# Patient Record
Sex: FEMALE | Race: WHITE | NOT HISPANIC OR LATINO | Employment: UNEMPLOYED | URBAN - METROPOLITAN AREA
[De-identification: names, ages, dates, MRNs, and addresses within clinical notes are randomized per-mention and may not be internally consistent; named-entity substitution may affect disease eponyms.]

---
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
## 2021-07-12 ENCOUNTER — TELEPHONE (OUTPATIENT)
Dept: FAMILY MEDICINE | Facility: CLINIC | Age: 21
End: 2021-07-12

## 2021-07-12 NOTE — TELEPHONE ENCOUNTER
Reason for call:  Other   Patient called regarding (reason for call): appointment  Additional comments:   Patient going back to college in 3 weeks. She would really prefer seeing Day for her sports physical if this is possible. Please call Mom back to see if this can be possible.    Phone number to reach patient:  Other phone number:  [phone] Koki    Best Time:  any    Can we leave a detailed message on this number?  YES    Travel screening: Not Applicable

## 2021-07-14 NOTE — TELEPHONE ENCOUNTER
eJlena Heller CMA contacted [facility] on 07/14/21 and left a message. If patient calls back please schedule Physical/sports forms next wk or the wk after.   Per provider ok to use same day slot

## 2021-07-19 ENCOUNTER — OFFICE VISIT (OUTPATIENT)
Dept: FAMILY MEDICINE | Facility: CLINIC | Age: 21
End: 2021-07-19
Payer: COMMERCIAL

## 2021-07-19 VITALS
OXYGEN SATURATION: 97 % | DIASTOLIC BLOOD PRESSURE: 69 MMHG | HEART RATE: 80 BPM | WEIGHT: 162 LBS | HEIGHT: 70 IN | TEMPERATURE: 97.9 F | BODY MASS INDEX: 23.19 KG/M2 | RESPIRATION RATE: 16 BRPM | SYSTOLIC BLOOD PRESSURE: 116 MMHG

## 2021-07-19 DIAGNOSIS — Z11.1 SCREENING EXAMINATION FOR PULMONARY TUBERCULOSIS: ICD-10-CM

## 2021-07-19 DIAGNOSIS — Z30.011 ENCOUNTER FOR INITIAL PRESCRIPTION OF CONTRACEPTIVE PILLS: ICD-10-CM

## 2021-07-19 DIAGNOSIS — L70.0 ACNE VULGARIS: ICD-10-CM

## 2021-07-19 DIAGNOSIS — Z11.59 NEED FOR HEPATITIS C SCREENING TEST: ICD-10-CM

## 2021-07-19 DIAGNOSIS — Z02.89 ENCOUNTER FOR COMPLETION OF FORM WITH PATIENT: ICD-10-CM

## 2021-07-19 DIAGNOSIS — Z11.4 SCREENING FOR HIV (HUMAN IMMUNODEFICIENCY VIRUS): ICD-10-CM

## 2021-07-19 DIAGNOSIS — Z00.01 ENCOUNTER FOR ROUTINE ADULT MEDICAL EXAM WITH ABNORMAL FINDINGS: Primary | ICD-10-CM

## 2021-07-19 PROCEDURE — 87389 HIV-1 AG W/HIV-1&-2 AB AG IA: CPT | Performed by: PHYSICIAN ASSISTANT

## 2021-07-19 PROCEDURE — 86481 TB AG RESPONSE T-CELL SUSP: CPT | Mod: 59 | Performed by: PHYSICIAN ASSISTANT

## 2021-07-19 PROCEDURE — 86803 HEPATITIS C AB TEST: CPT | Performed by: PHYSICIAN ASSISTANT

## 2021-07-19 PROCEDURE — 86481 TB AG RESPONSE T-CELL SUSP: CPT | Performed by: PHYSICIAN ASSISTANT

## 2021-07-19 PROCEDURE — 36415 COLL VENOUS BLD VENIPUNCTURE: CPT | Performed by: PHYSICIAN ASSISTANT

## 2021-07-19 PROCEDURE — 99395 PREV VISIT EST AGE 18-39: CPT | Performed by: PHYSICIAN ASSISTANT

## 2021-07-19 RX ORDER — SULFACETAMIDE SODIUM 100 MG/ML
LOTION TOPICAL
COMMUNITY
Start: 2020-12-28 | End: 2021-07-19

## 2021-07-19 RX ORDER — LEVONORGESTREL AND ETHINYL ESTRADIOL 100-20(84)
1 KIT ORAL DAILY
Qty: 91 TABLET | Refills: 3 | Status: SHIPPED | OUTPATIENT
Start: 2021-07-19 | End: 2022-02-14 | Stop reason: SINTOL

## 2021-07-19 RX ORDER — TRETINOIN 0.5 MG/G
CREAM TOPICAL
Qty: 45 G | Refills: 3 | Status: SHIPPED | OUTPATIENT
Start: 2021-07-19 | End: 2023-07-27

## 2021-07-19 RX ORDER — SULFACETAMIDE SODIUM 100 MG/ML
LOTION TOPICAL DAILY
Qty: 118 ML | Refills: 3 | Status: SHIPPED | OUTPATIENT
Start: 2021-07-19 | End: 2023-07-27

## 2021-07-19 RX ORDER — TRETINOIN 0.5 MG/G
CREAM TOPICAL
COMMUNITY
Start: 2020-12-28 | End: 2021-07-19

## 2021-07-19 ASSESSMENT — ENCOUNTER SYMPTOMS
DIARRHEA: 0
COUGH: 0
DYSURIA: 0
FEVER: 0
FREQUENCY: 0
BREAST MASS: 0
NAUSEA: 0
HEMATOCHEZIA: 0
ARTHRALGIAS: 0
NERVOUS/ANXIOUS: 0
HEARTBURN: 0
PARESTHESIAS: 0
EYE PAIN: 0
MYALGIAS: 0
CHILLS: 0
JOINT SWELLING: 0
ABDOMINAL PAIN: 0
CONSTIPATION: 0
WEAKNESS: 0
HEMATURIA: 0
HEADACHES: 0
SORE THROAT: 0
DIZZINESS: 0
SHORTNESS OF BREATH: 0
PALPITATIONS: 0

## 2021-07-19 ASSESSMENT — MIFFLIN-ST. JEOR: SCORE: 1589.46

## 2021-07-19 NOTE — LETTER
July 23, 2021      Sheridan Bustillos  [address]  [place] [address]        Dear ,    We are writing to inform you of your test results.    Your tuberculosis screening is negative.       Resulted Orders   Hepatitis C Screen Reflex to HCV RNA Quant and Genotype   Result Value Ref Range    Hepatitis C Antibody Nonreactive Nonreactive    Narrative    Assay performance characteristics have not been established for newborns, infants, and children.   HIV Antigen Antibody Combo   Result Value Ref Range    HIV Antigen Antibody Combo Nonreactive Nonreactive      Comment:      HIV-1 p24 Ag & HIV-1/HIV-2 Ab Not Detected   Quantiferon TB Gold Plus   Result Value Ref Range    Quantiferon-TB Gold Plus Negative Negative      Comment:      No interferon gamma response to M.tuberculosis antigens was detected. Infection with M.tuberculosis is unlikely, however a single negative result does not exclude infection. In patients at high risk for infection, a second test should be considered in accordance with the 2017 ATS/IDSA/CDC Clinical Pract  ice Guidelines for Diagnosis of Tuberculosis in Adults and Children     TB1 Ag minus Nil Value 0.04 IU/mL    TB2 Ag minus Nil Value 0.04 IU/mL    Mitogen minus Nil Result 9.97 IU/mL    Nil Result 0.03 IU/mL   Quantiferon TB Gold Plus Grey Tube   Result Value Ref Range    Quantiferon Nil Tube 0.03 IU/mL   Quantiferon TB Gold Plus Green Tube   Result Value Ref Range    Quantiferon TB1 Tube 0.07 IU/mL   Quantiferon TB Gold Plus Yellow Tube   Result Value Ref Range    Quantiferon TB2 Tube 0.07    Quantiferon TB Gold Plus Purple Tube   Result Value Ref Range    Quantiferon Mitogen 10.00 IU/mL   Quantiferon TB Gold Plus   Result Value Ref Range    Quantiferon-TB Gold Plus Negative Negative      Comment:      No interferon gamma response to M.tuberculosis antigens was detected. Infection with M.tuberculosis is unlikely, however a single negative result does not exclude infection. In patients at  high risk for infection, a second test should be considered in accordance with the 2017 ATS/IDSA/CDC Clinical Pract  ice Guidelines for Diagnosis of Tuberculosis in Adults and Children     TB1 Ag minus Nil Value 0.04 IU/mL    TB2 Ag minus Nil Value 0.04 IU/mL    Mitogen minus Nil Result 9.97 IU/mL    Nil Result 0.03 IU/mL       If you have any questions or concerns, please call the clinic at the number listed above.       Sincerely,      Day Watson PA-C/mp
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
## 2021-07-20 LAB
HCV AB SERPL QL IA: NONREACTIVE
HIV 1+2 AB+HIV1 P24 AG SERPL QL IA: NONREACTIVE

## 2021-07-21 LAB
QUANTIFERON MITOGEN: 10 IU/ML
QUANTIFERON NIL TUBE: 0.03 IU/ML
QUANTIFERON TB1 TUBE: 0.07 IU/ML
QUANTIFERON TB2 TUBE: 0.07

## 2021-07-22 LAB
GAMMA INTERFERON BACKGROUND BLD IA-ACNC: 0.03 IU/ML
GAMMA INTERFERON BACKGROUND BLD IA-ACNC: 0.03 IU/ML
M TB IFN-G BLD-IMP: NEGATIVE
M TB IFN-G BLD-IMP: NEGATIVE
M TB IFN-G CD4+ BCKGRND COR BLD-ACNC: 9.97 IU/ML
M TB IFN-G CD4+ BCKGRND COR BLD-ACNC: 9.97 IU/ML
MITOGEN IGNF BCKGRD COR BLD-ACNC: 0.04 IU/ML

## 2021-07-23 NOTE — RESULT ENCOUNTER NOTE
Please send the following letter to the patient:    Sheridan,    Your tuberculosis screening is negative.    Please call me with any questions or concerns.          Day Watson PA-C

## 2021-11-24 ENCOUNTER — TELEPHONE (OUTPATIENT)
Dept: FAMILY MEDICINE | Facility: CLINIC | Age: 21
End: 2021-11-24
Payer: COMMERCIAL

## 2021-11-24 NOTE — TELEPHONE ENCOUNTER
Called patient, however unable to leave a message.    Paulina Whelan RN BSN  [facility]

## 2021-11-24 NOTE — TELEPHONE ENCOUNTER
Reason for Call:  Other     Detailed comments: Patient said that she was prescribed a birth control and she was not to get mensae and she has gotten this and has questions please advise.    Phone Number Patient can be reached at: Home number on file [phone] (home)    Best Time:     Can we leave a detailed message on this number? YES    Call taken on 11/24/2021 at 10:59 AM by Nanda Hyatt

## 2021-11-26 NOTE — TELEPHONE ENCOUNTER
Called patient again, however unable to leave a message.     No active My Chart.     Paulina Whelan RN BSN  [facility]

## 2021-11-30 NOTE — TELEPHONE ENCOUNTER
No return call after 3 attempts. Will close encounter. Await patient to call again.  Haylee Marcos RN  [facility]

## 2022-02-14 ENCOUNTER — VIRTUAL VISIT (OUTPATIENT)
Dept: FAMILY MEDICINE | Facility: CLINIC | Age: 22
End: 2022-02-14
Payer: COMMERCIAL

## 2022-02-14 DIAGNOSIS — F41.0 PANIC ATTACK: Primary | ICD-10-CM

## 2022-02-14 DIAGNOSIS — F43.23 SITUATIONAL MIXED ANXIETY AND DEPRESSIVE DISORDER: ICD-10-CM

## 2022-02-14 PROCEDURE — 99214 OFFICE O/P EST MOD 30 MIN: CPT | Mod: TEL | Performed by: PHYSICIAN ASSISTANT

## 2022-02-14 RX ORDER — HYDROXYZINE HYDROCHLORIDE 25 MG/1
25 TABLET, FILM COATED ORAL 3 TIMES DAILY PRN
Qty: 30 TABLET | Refills: 0 | Status: SHIPPED | OUTPATIENT
Start: 2022-02-14 | End: 2022-03-14

## 2022-02-14 RX ORDER — VENLAFAXINE HYDROCHLORIDE 37.5 MG/1
37.5 CAPSULE, EXTENDED RELEASE ORAL DAILY
Qty: 30 CAPSULE | Refills: 0 | Status: SHIPPED | OUTPATIENT
Start: 2022-02-14 | End: 2022-03-08

## 2022-02-14 ASSESSMENT — ANXIETY QUESTIONNAIRES
IF YOU CHECKED OFF ANY PROBLEMS ON THIS QUESTIONNAIRE, HOW DIFFICULT HAVE THESE PROBLEMS MADE IT FOR YOU TO DO YOUR WORK, TAKE CARE OF THINGS AT HOME, OR GET ALONG WITH OTHER PEOPLE: VERY DIFFICULT
7. FEELING AFRAID AS IF SOMETHING AWFUL MIGHT HAPPEN: SEVERAL DAYS
1. FEELING NERVOUS, ANXIOUS, OR ON EDGE: SEVERAL DAYS
3. WORRYING TOO MUCH ABOUT DIFFERENT THINGS: NEARLY EVERY DAY
6. BECOMING EASILY ANNOYED OR IRRITABLE: SEVERAL DAYS
5. BEING SO RESTLESS THAT IT IS HARD TO SIT STILL: SEVERAL DAYS
GAD7 TOTAL SCORE: 9
2. NOT BEING ABLE TO STOP OR CONTROL WORRYING: SEVERAL DAYS

## 2022-02-14 ASSESSMENT — PATIENT HEALTH QUESTIONNAIRE - PHQ9
SUM OF ALL RESPONSES TO PHQ QUESTIONS 1-9: 12
5. POOR APPETITE OR OVEREATING: SEVERAL DAYS

## 2022-02-14 NOTE — PATIENT INSTRUCTIONS
Start effexor (venlafaxine) daily for anxiety and depression.  This may take 4 weeks to kick in.  Monitor for side effects and reach out if an issue.   Use hydroxyzine as needed for anxiety/panic.  It may make you drowsy. Do not drive or mix with alcohol.      Limit caffeine.   Continue exercise (on [email or web address])  Continue to hold the birth control.  May revisit this once mental health is stabilized and may try a different birth control pill.   Hold off on the stress reducing vitamin, ok to take the Vitamin D.    Continue visits with therapist.

## 2022-02-14 NOTE — PROGRESS NOTES
Sheridan is a 22 year old who is being evaluated via a billable telephone visit (with Mom Katy)    What phone number would you like to be contacted at? [phone]  How would you like to obtain your AVS? Mail a copy    Assessment & Plan     Panic attack      I discussed the potential side effects of antianxiety medications as well as the likelihood of worsening before improvement over the next few weeks. I reemphasized the importance of a multi-armed approach towards the treatment of anxiety including regular exercise, adequate sleep, and a well rounded, low-glycemic diet.  In addition, I emphasized the importance of ongoing development of her support network. If new or worsening symptoms, she will seek help immediately.    Start effexor (venlafaxine) daily for anxiety and depression.  This may take 4 weeks to kick in.  Monitor for side effects and reach out if an issue.   Use hydroxyzine as needed for anxiety/panic.  It may make you drowsy. Do not drive or mix with alcohol.      Limit caffeine.   Continue exercise (on [employer])  Continue to hold the birth control.  May revisit this once mental health is stabilized and may try a different birth control pill.   Hold off on the stress reducing vitamin, ok to take the Vitamin D.    Continue visits with therapist.        - hydrOXYzine (ATARAX) 25 MG tablet; Take 1 tablet (25 mg) by mouth 3 times daily as needed for anxiety  - venlafaxine (EFFEXOR-XR) 37.5 MG 24 hr capsule; Take 1 capsule (37.5 mg) by mouth daily    Situational mixed anxiety and depressive disorder      Will start medication today.  We discussed various treatment options, including pros and cons of each.  I discussed in detail possible side effects of medications and that the full benefits of medication may take 4-6 weeks, patient verbalized understanding.  See ishBowl for orders.    We have discussed counseling as an adjuvent to medical treatment, patient will continue with her counseling.      Follow-up in 4 weeks.      Patient is to follow-up sooner should symptoms change or worsen.     Discussed risk of increased suicidal thoughts when starting medication, she verbalized understanding and will reach out immediately if this occurs.     I warned Sheridan Bustillos that hydroxyzine may cause drowsiness and she is not to drive or operate heavy machinery after taking this medication and do not mix with alcohol.                 - hydrOXYzine (ATARAX) 25 MG tablet; Take 1 tablet (25 mg) by mouth 3 times daily as needed for anxiety  - venlafaxine (EFFEXOR-XR) 37.5 MG 24 hr capsule; Take 1 capsule (37.5 mg) by mouth daily      30 minutes spent on the date of the encounter doing chart review, history and exam, documentation and further activities per the note       Depression Screening Follow Up    PHQ 2/14/2022   PHQ-9 Total Score 12   Q9: Thoughts of better off dead/self-harm past 2 weeks Several days                 Follow Up      Follow Up Actions Taken  has appt with therapist later today (meeting weekly)        Return in about 4 weeks (around 3/14/2022) for med recheck..    Yeny Askew PA-C  [facility] [place] is a 22 year old who presents for the following health issues  accompanied by her mother (Katy).    HPI     Abnormal Mood Symptoms  Onset/Duration: 1 year  Description: intermittent anxiety and depression  Depression (if yes, do PHQ-9): YES  Anxiety (if yes, do TONY-7): YES  Accompanying Signs & Symptoms:  Still participating in activities that you used to enjoy: YES  Fatigue: YES  Irritability: YES- sometimes  Difficulty concentrating: YES- sometimes  Changes in appetite: YES  Problems with sleep: YES  Heart racing/beating fast: YES  Abnormally elevated, expansive, or irritable mood: YES  Persistently increased activity or energy: YES- sometimes  Thoughts of hurting yourself or others: YES  History:  Recent stress or major life event: no  Prior depression  or anxiety: intermittent depression (seasonal)  Family history of depression or anxiety: YES- both  Alcohol/drug use: YES- alcohol  Difficulty sleeping: YES- waking frequently, insomnia  Precipitating or alleviating factors: working out, melatonin  Therapies tried and outcome: working out, melatonin - helps     Has noticed panic attacks for the past year (worried about dying).  Panic attacks not often, only when triggered.   Last panic attack was after drinking Celius energy drink.    She was started on birth control in July 2022 and since that time she has had a change in her mood.  She tried going off it and then went back on for a month.  Currently she is off the medication.   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  She is passing most of her classes and grades are doing well.      Mom is a counselor and Sheridan has good coping skills  Mom has a h/o MI.      Sister and Mom are both on venlafaxine.  Other family members have done well with Wellbutrin.     She does have a therapist that she meets with once per week.      Has a stress relief vitamin, wondering if she should take this.    PHQ 2/14/2022   PHQ-9 Total Score 12   Q9: Thoughts of better off dead/self-harm past 2 weeks Several days     TONY-7 SCORE 2/14/2022   Total Score 9           Review of Systems   Constitutional, HEENT, cardiovascular, pulmonary, GI, , musculoskeletal, neuro, skin, endocrine and psych systems are negative, except as otherwise noted.      Objective           Vitals:  No vitals were obtained today due to virtual visit.    Physical Exam   healthy, alert and anxious throughout visit, difficulty concentrating at times and mom available to answer questions.   PSYCH: Alert and oriented times 3; coherent speech, normal   rate and volume, Difficulty articulating thoughts at times, able   to abstract reason, no tangential thoughts, no hallucinations   or delusions  Her affect is  normal  RESP: No cough, no audible wheezing, able to talk in full sentences  Remainder of exam unable to be completed due to telephone visits                Phone call duration: 22 minutes

## 2022-02-15 ASSESSMENT — ANXIETY QUESTIONNAIRES: GAD7 TOTAL SCORE: 9

## 2022-02-16 ENCOUNTER — TELEPHONE (OUTPATIENT)
Dept: FAMILY MEDICINE | Facility: CLINIC | Age: 22
End: 2022-02-16
Payer: COMMERCIAL

## 2022-02-16 NOTE — TELEPHONE ENCOUNTER
Patient and mom calling to request letter for her school excusing her from academic requirements this week due to her mental health. Please email letter to [email or web address] and to mom at [email or web address].

## 2022-02-16 NOTE — TELEPHONE ENCOUNTER
Called patient, she gave verbal consent to speak with her mom. Below message relayed. She said the patient is doing better and getting some much needed rest. They will try the recommended advise below.     Yeny Vargas RN

## 2022-02-16 NOTE — TELEPHONE ENCOUNTER
Bee, Please email letter to addresses below.      RN pool, please call Sheridan.  She can try 1/2 tab of the hydroxyzine (if it makes her too drowsy, just stop it and we'll wait for the effexor to kick in)    Yeny Askew PA-C

## 2022-02-16 NOTE — LETTER
AUTHORIZATION FOR ADMINISTRATION OF MEDICATION AT SCHOOL      Student:  Sheridan Bustillos    YOB: 2000    I have prescribed the following medication for Sheridan to treat anxiety.    Medication:    Outpatient Medications Marked as Taking for the 22 encounter (Telephone) with Yeny Askew PA-C   Medication Sig     hydrOXYzine (ATARAX) 25 MG tablet Take 1 tablet (25 mg) by mouth 3 times daily as needed for anxiety     venlafaxine (EFFEXOR-XR) 37.5 MG 24 hr capsule Take 1 capsule (37.5 mg) by mouth daily     All authorizations  at the end of the school year or at the end of   Extended School Year summer school programs            Electronically Signed By  Provider: YENY ASKEW                                                                                             Date: 2022

## 2022-02-16 NOTE — LETTER
[facility]  [address] [place] [address]  Phone: [phone]    February 16, 2022        Sheridan Bustillos  [address]  [place] [address]          To whom it may concern:    RE: Sheridan Bustillos    Please excuse Sheridan from school and academic requirements this week (through 2/19/22) due to medical reasons.     Please contact me for questions or concerns.      Sincerely,        Yeny Askew PA-C

## 2022-02-17 NOTE — TELEPHONE ENCOUNTER
Another letter printed with medications listed, please email to addresses below  Yeny Askew PA-C

## 2022-02-17 NOTE — TELEPHONE ENCOUNTER
Mom is calling on behalf of patient for two things:     1) cutting the hydrOXYzine (ATARAX) 25 MG tablet in half is working great so far.   2) the patient's volleyball  at her school is asking for a new letter stating the name of the medications that pt is on and that they are used to treat anxiety.    Mom was advised that this will be up to Yeny MCGUIRE if a new letter is generated. OK to leave detailed info on Mom's cell at [phone].

## 2022-03-11 ENCOUNTER — TELEPHONE (OUTPATIENT)
Dept: FAMILY MEDICINE | Facility: CLINIC | Age: 22
End: 2022-03-11
Payer: COMMERCIAL

## 2022-03-11 NOTE — TELEPHONE ENCOUNTER
Spoke with tash(mother) patient has appt scheduled with Day for med check on 3/14/2022. Mom would like forms to be completed by Yeny Askew.  Forms to Yeny's in box.

## 2022-03-14 ENCOUNTER — VIRTUAL VISIT (OUTPATIENT)
Dept: FAMILY MEDICINE | Facility: CLINIC | Age: 22
End: 2022-03-14
Payer: COMMERCIAL

## 2022-03-14 DIAGNOSIS — F41.0 PANIC ATTACK: ICD-10-CM

## 2022-03-14 DIAGNOSIS — F43.23 SITUATIONAL MIXED ANXIETY AND DEPRESSIVE DISORDER: Primary | ICD-10-CM

## 2022-03-14 PROCEDURE — 99213 OFFICE O/P EST LOW 20 MIN: CPT | Mod: GT | Performed by: PHYSICIAN ASSISTANT

## 2022-03-14 RX ORDER — HYDROXYZINE HYDROCHLORIDE 25 MG/1
25 TABLET, FILM COATED ORAL 3 TIMES DAILY PRN
Qty: 30 TABLET | Refills: 3 | Status: SHIPPED | OUTPATIENT
Start: 2022-03-14 | End: 2023-07-27

## 2022-03-14 RX ORDER — VENLAFAXINE HYDROCHLORIDE 37.5 MG/1
37.5 CAPSULE, EXTENDED RELEASE ORAL DAILY
Qty: 90 CAPSULE | Refills: 0 | Status: SHIPPED | OUTPATIENT
Start: 2022-03-14 | End: 2022-07-05

## 2022-03-14 ASSESSMENT — ANXIETY QUESTIONNAIRES
5. BEING SO RESTLESS THAT IT IS HARD TO SIT STILL: SEVERAL DAYS
6. BECOMING EASILY ANNOYED OR IRRITABLE: NOT AT ALL
GAD7 TOTAL SCORE: 2
7. FEELING AFRAID AS IF SOMETHING AWFUL MIGHT HAPPEN: NOT AT ALL
3. WORRYING TOO MUCH ABOUT DIFFERENT THINGS: NOT AT ALL
1. FEELING NERVOUS, ANXIOUS, OR ON EDGE: SEVERAL DAYS
2. NOT BEING ABLE TO STOP OR CONTROL WORRYING: NOT AT ALL
IF YOU CHECKED OFF ANY PROBLEMS ON THIS QUESTIONNAIRE, HOW DIFFICULT HAVE THESE PROBLEMS MADE IT FOR YOU TO DO YOUR WORK, TAKE CARE OF THINGS AT HOME, OR GET ALONG WITH OTHER PEOPLE: SOMEWHAT DIFFICULT

## 2022-03-14 ASSESSMENT — PATIENT HEALTH QUESTIONNAIRE - PHQ9
SUM OF ALL RESPONSES TO PHQ QUESTIONS 1-9: 5
5. POOR APPETITE OR OVEREATING: NOT AT ALL

## 2022-03-14 NOTE — TELEPHONE ENCOUNTER
Had appt with Day today and this was not discussed.  Will forward along to Day to address  Yeny Askew PA-C

## 2022-03-14 NOTE — PROGRESS NOTES
Sheridan is a 22 year old who is being evaluated via a billable video visit.      How would you like to obtain your AVS? MyChart  If the video visit is dropped, the invitation should be resent by: Text to cell phone: [phone]  Will anyone else be joining your video visit? No      Video Start Time: 8:01am    Assessment & Plan     1. Situational mixed anxiety and depressive disorder    2. Panic attack        1,2) PHQ and TONY scores much better. Continue medications, no changes today. She is doing much better.      Prescription drug management         Depression Screening Follow Up    PHQ 3/14/2022   PHQ-9 Total Score 5   Q9: Thoughts of better off dead/self-harm past 2 weeks Several days       Return in about 2 months (around 5/14/2022) for your annual physical.    Day Watson PA-C  [facility] [place] Swartz is a 22 year old who presents for the following health issues     HPI     Depression and Anxiety Follow-Up    How are you doing with your depression since your last visit? Improved     How are you doing with your anxiety since your last visit?  Improved     Are you having other symptoms that might be associated with depression or anxiety? No    Have you had a significant life event? No     Do you have any concerns with your use of alcohol or other drugs? No    Social History     Tobacco Use     Smoking status: Never Smoker     Smokeless tobacco: Never Used   Vaping Use     Vaping Use: Never used   Substance Use Topics     Alcohol use: Yes     Comment: occasional     Drug use: Not Currently     PHQ 2/14/2022 3/14/2022   PHQ-9 Total Score 12 5   Q9: Thoughts of better off dead/self-harm past 2 weeks Several days Several days     TONY-7 SCORE 2/14/2022 3/14/2022   Total Score 9 2       Suicide Assessment Five-step Evaluation and Treatment (SAFE-T)      How many servings of fruits and vegetables do you eat daily?  0-1    On average, how many sweetened beverages do you drink  each day (Examples: soda, juice, sweet tea, etc.  Do NOT count diet or artificially sweetened beverages)?   2    How many days per week do you exercise enough to make your heart beat faster? 6-7    How many minutes a day do you exercise enough to make your heart beat faster? 60 or more    How many days per week do you miss taking your medication? 0        Review of Systems   Constitutional, and psych systems are negative, except as otherwise noted.      Objective           Vitals:  No vitals were obtained today due to virtual visit.    Physical Exam   GENERAL: Healthy, alert and no distress  EYES: Eyes grossly normal to inspection.  No discharge or erythema, or obvious scleral/conjunctival abnormalities.  RESP: No audible wheeze, cough, or visible cyanosis.  No visible retractions or increased work of breathing.    SKIN: Visible skin clear. No significant rash, abnormal pigmentation or lesions.  NEURO: Cranial nerves grossly intact.  Mentation and speech appropriate for age.  PSYCH: Mentation appears normal, affect normal/bright, judgement and insight intact, normal speech and appearance well-groomed.          Video-Visit Details    Type of service:  Video Visit    Video End Time: 8:07am, 6 mins    Originating Location (pt. Location): Home    Distant Location (provider location):  [facility] nub[facility]     Platform used for Video Visit: [employer]

## 2022-03-15 ASSESSMENT — ANXIETY QUESTIONNAIRES: GAD7 TOTAL SCORE: 2

## 2022-03-17 ENCOUNTER — VIRTUAL VISIT (OUTPATIENT)
Dept: FAMILY MEDICINE | Facility: CLINIC | Age: 22
End: 2022-03-17
Payer: COMMERCIAL

## 2022-03-17 DIAGNOSIS — F43.23 SITUATIONAL MIXED ANXIETY AND DEPRESSIVE DISORDER: Primary | ICD-10-CM

## 2022-03-17 DIAGNOSIS — Z02.89 ENCOUNTER FOR COMPLETION OF FORM WITH PATIENT: ICD-10-CM

## 2022-03-17 PROCEDURE — 99212 OFFICE O/P EST SF 10 MIN: CPT | Mod: GT | Performed by: PHYSICIAN ASSISTANT

## 2022-03-17 NOTE — PROGRESS NOTES
Sheridan is a 22 year old who is being evaluated via a billable video visit.      How would you like to obtain your AVS? MyChart  If the video visit is dropped, the invitation should be resent by: Text to cell phone: [phone]  Will anyone else be joining your video visit? No      Video Start Time: 8:18am    Assessment & Plan     1. Situational mixed anxiety and depressive disorder    2. Encounter for completion of form with patient        1,2) Form completed, sent to scanning.              No follow-ups on file.    LELA Avalos [facility] [place] Swartz is a 22 year old who presents for the following health issues     HPI     Forms for school. Needing a 504 plan.   Due to anxiety she has had trouble getting homework done and turning assignments in on time  Feels she would do better if she was given deadline extensions  Anxiety has improved since starting Effexor but she still has some breakthrough panic/acute anxiety - typically triggered by homework or assignment deadlines      Review of Systems   Constitutional, and psych systems are negative, except as otherwise noted.      Objective           Vitals:  No vitals were obtained today due to virtual visit.    Physical Exam   GENERAL: Healthy, alert and no distress  EYES: Eyes grossly normal to inspection.  No discharge or erythema, or obvious scleral/conjunctival abnormalities.  RESP: No audible wheeze, cough, or visible cyanosis.  No visible retractions or increased work of breathing.    SKIN: Visible skin clear. No significant rash, abnormal pigmentation or lesions.  NEURO: Cranial nerves grossly intact.  Mentation and speech appropriate for age.  PSYCH: Mentation appears normal, affect normal/bright, judgement and insight intact, normal speech and appearance well-groomed.          Video-Visit Details    Type of service:  Video Visit    Video End Time: 8:33am, 15 mins    Originating Location (pt. Location):  Home    Distant Location (provider location):  [facility] [place]     Platform used for Video Visit: [employer]

## 2022-03-22 ENCOUNTER — TELEPHONE (OUTPATIENT)
Dept: FAMILY MEDICINE | Facility: CLINIC | Age: 22
End: 2022-03-22
Payer: COMMERCIAL

## 2022-03-22 DIAGNOSIS — F43.23 SITUATIONAL MIXED ANXIETY AND DEPRESSIVE DISORDER: Primary | ICD-10-CM

## 2022-03-22 NOTE — TELEPHONE ENCOUNTER
Annette from [facility].  Calling in regards to disability forms. Diagnosis was not provided for pt.    What should we report?    [phone] - please contact Annette with verbal Diagnosis.

## 2022-03-28 NOTE — TELEPHONE ENCOUNTER
Called Annette. No answer. Left message to call back.    Monserrat Tam, SALEEM on 3/28/2022 at 8:24 AM

## 2022-07-01 DIAGNOSIS — F43.23 SITUATIONAL MIXED ANXIETY AND DEPRESSIVE DISORDER: ICD-10-CM

## 2022-07-01 DIAGNOSIS — F41.0 PANIC ATTACK: ICD-10-CM

## 2022-07-04 NOTE — TELEPHONE ENCOUNTER
Routing refill request to provider for review/approval because:  Drug not on the [facility] refill protocol   PHQ-9 score:    PHQ 3/14/2022   PHQ-9 Total Score 5   Q9: Thoughts of better off dead/self-harm past 2 weeks Several days

## 2022-07-05 RX ORDER — VENLAFAXINE HYDROCHLORIDE 37.5 MG/1
CAPSULE, EXTENDED RELEASE ORAL
Qty: 90 CAPSULE | Refills: 0 | Status: SHIPPED | OUTPATIENT
Start: 2022-07-05 | End: 2022-10-06

## 2022-07-26 ENCOUNTER — TELEPHONE (OUTPATIENT)
Dept: FAMILY MEDICINE | Facility: CLINIC | Age: 22
End: 2022-07-26

## 2022-07-26 DIAGNOSIS — Z01.89 PATIENT REQUEST FOR DIAGNOSTIC TESTING: Primary | ICD-10-CM

## 2022-07-26 NOTE — TELEPHONE ENCOUNTER
Patient's mother calling, states that patient is leaving for college at the end of the week and patient just found out that she is required by her school to have a sickle cell test completed. Patient's mother is wondering if it would be possible to have lab ordered without appointment. Order pended. Please advise.     Harriett Nazario RN   [facility]

## 2022-07-28 ENCOUNTER — OFFICE VISIT (OUTPATIENT)
Dept: FAMILY MEDICINE | Facility: CLINIC | Age: 22
End: 2022-07-28
Payer: COMMERCIAL

## 2022-07-28 VITALS
HEIGHT: 70 IN | SYSTOLIC BLOOD PRESSURE: 116 MMHG | OXYGEN SATURATION: 98 % | RESPIRATION RATE: 18 BRPM | DIASTOLIC BLOOD PRESSURE: 84 MMHG | WEIGHT: 154 LBS | BODY MASS INDEX: 22.05 KG/M2 | HEART RATE: 97 BPM | TEMPERATURE: 98.1 F

## 2022-07-28 DIAGNOSIS — D50.8 IRON DEFICIENCY ANEMIA SECONDARY TO INADEQUATE DIETARY IRON INTAKE: ICD-10-CM

## 2022-07-28 DIAGNOSIS — Z01.89 PATIENT REQUEST FOR DIAGNOSTIC TESTING: ICD-10-CM

## 2022-07-28 DIAGNOSIS — Z13.0 ENCOUNTER FOR SICKLE-CELL SCREENING: Primary | ICD-10-CM

## 2022-07-28 LAB
BASOPHILS # BLD AUTO: 0 10E3/UL (ref 0–0.2)
BASOPHILS NFR BLD AUTO: 0 %
EOSINOPHIL # BLD AUTO: 0.1 10E3/UL (ref 0–0.7)
EOSINOPHIL NFR BLD AUTO: 1 %
ERYTHROCYTE [DISTWIDTH] IN BLOOD BY AUTOMATED COUNT: 14.4 % (ref 10–15)
HCT VFR BLD AUTO: 39.5 % (ref 35–47)
HGB BLD-MCNC: 12.9 G/DL (ref 11.7–15.7)
IMM GRANULOCYTES # BLD: 0 10E3/UL
IMM GRANULOCYTES NFR BLD: 0 %
LYMPHOCYTES # BLD AUTO: 1.9 10E3/UL (ref 0.8–5.3)
LYMPHOCYTES NFR BLD AUTO: 21 %
MCH RBC QN AUTO: 26.7 PG (ref 26.5–33)
MCHC RBC AUTO-ENTMCNC: 32.7 G/DL (ref 31.5–36.5)
MCV RBC AUTO: 82 FL (ref 78–100)
MONOCYTES # BLD AUTO: 0.8 10E3/UL (ref 0–1.3)
MONOCYTES NFR BLD AUTO: 9 %
NEUTROPHILS # BLD AUTO: 6.2 10E3/UL (ref 1.6–8.3)
NEUTROPHILS NFR BLD AUTO: 69 %
PLATELET # BLD AUTO: 364 10E3/UL (ref 150–450)
RBC # BLD AUTO: 4.84 10E6/UL (ref 3.8–5.2)
WBC # BLD AUTO: 9 10E3/UL (ref 4–11)

## 2022-07-28 PROCEDURE — 85025 COMPLETE CBC W/AUTO DIFF WBC: CPT | Performed by: INTERNAL MEDICINE

## 2022-07-28 PROCEDURE — 99000 SPECIMEN HANDLING OFFICE-LAB: CPT | Performed by: INTERNAL MEDICINE

## 2022-07-28 PROCEDURE — 36415 COLL VENOUS BLD VENIPUNCTURE: CPT | Performed by: INTERNAL MEDICINE

## 2022-07-28 PROCEDURE — 99213 OFFICE O/P EST LOW 20 MIN: CPT | Performed by: INTERNAL MEDICINE

## 2022-07-28 PROCEDURE — 83021 HEMOGLOBIN CHROMOTOGRAPHY: CPT | Mod: 90 | Performed by: INTERNAL MEDICINE

## 2022-07-28 ASSESSMENT — PAIN SCALES - GENERAL: PAINLEVEL: NO PAIN (0)

## 2022-07-28 NOTE — PROGRESS NOTES
"  Assessment & Plan     Encounter for sickle-cell screening  She plays volleyball for the college  They are requesting that she get a sickle cell testing done  This has been ordered  - Hemoglobin S with Reflex to RBC Solubility; Future  - Hemoglobin S with Reflex to RBC Solubility    Iron deficiency anemia secondary to inadequate dietary iron intake  History of iron deficiency anemia in the past  Her ferritin and iron were low on her hemoglobin was 11.6  We will repeat CBC today  - CBC with platelets and differential; Future  - CBC with platelets and differential    Patient request for diagnostic testing    - Hemoglobin S with Reflex to RBC Solubility      20 minutes spent on the date of the encounter doing chart review, history and exam, documentation and further activities per the note           No follow-ups on file.    Isreal Del Toro MD  [facility]   Sheridan is a 22 year old accompanied by her mother, presenting for the following health issues:  Consult For (Blood work for school )      HPI   Requesting sickle cell testing   She plays volleyball and her college wants to get this tested        Review of Systems   Constitutional, HEENT, cardiovascular, pulmonary, gi and gu systems are negative, except as otherwise noted.      Objective    /84 (BP Location: Left arm, Patient Position: Chair, Cuff Size: Adult Regular)   Pulse 97   Temp 98.1  F (36.7  C) (Tympanic)   Resp 18   Ht 1.785 m (5' 10.28\")   Wt 69.9 kg (154 lb)   SpO2 98%   BMI 21.92 kg/m    Body mass index is 21.92 kg/m .  Physical Exam   GENERAL: healthy, alert and no distress  EYES: Eyes grossly normal to inspection, PERRL and conjunctivae and sclerae normal  MS: no gross musculoskeletal defects noted, no edema  SKIN: no suspicious lesions or rashes  NEURO: Normal strength and tone, mentation intact and speech normal                    .  ..  "

## 2022-07-28 NOTE — PATIENT INSTRUCTIONS
At [facility], we strive to deliver an exceptional experience to you, every time we see you. If you receive a survey, please complete it as we do value your feedback.  If you have MyChart, you can expect to receive results automatically within 24 hours of their completion.  Your provider will send a note interpreting your results as well.   If you do not have MyChart, you should receive your results in about a week by mail.    Your care team:                            Family Medicine Internal Medicine   MD Austin Keita MD Shantel Branch-Fleming, MD Srinivasa Vaka, MD Katya Belousova, JEAN PIERRE Bacon CNP, MD ([facility]   Juni Hastings, MD Thao Dumont MD Amelia Massimini APRN CNP Kim Thein, APRN CNP Bethany Templen, MD             Clinic hours: Monday - Thursday 7 am-6 pm; Fridays 7 am-5 pm.   Urgent care: Monday - Friday 10 am- 8 pm; Saturday and Sunday 9 am-5 pm.    Clinic: [phone]       [facility]: Monday - Thursday 8 am - 7 pm; Friday 8 am - 6 pm  [facility]: [phone]

## 2022-07-30 LAB — HGB S BLD QL: NEGATIVE

## 2022-09-10 ENCOUNTER — HEALTH MAINTENANCE LETTER (OUTPATIENT)
Age: 22
End: 2022-09-10

## 2022-09-17 DIAGNOSIS — Z30.41 ENCOUNTER FOR SURVEILLANCE OF CONTRACEPTIVE PILLS: Primary | ICD-10-CM

## 2022-09-21 RX ORDER — LEVONORGESTREL AND ETHINYL ESTRADIOL 100-20(84)
1 KIT ORAL DAILY
Qty: 91 TABLET | Refills: 0 | Status: SHIPPED | OUTPATIENT
Start: 2022-09-21 | End: 2023-07-27

## 2022-10-06 ENCOUNTER — MYC REFILL (OUTPATIENT)
Dept: FAMILY MEDICINE | Facility: CLINIC | Age: 22
End: 2022-10-06

## 2022-10-06 DIAGNOSIS — F41.0 PANIC ATTACK: ICD-10-CM

## 2022-10-06 DIAGNOSIS — F43.23 SITUATIONAL MIXED ANXIETY AND DEPRESSIVE DISORDER: ICD-10-CM

## 2022-10-07 RX ORDER — VENLAFAXINE HYDROCHLORIDE 37.5 MG/1
37.5 CAPSULE, EXTENDED RELEASE ORAL DAILY
Qty: 90 CAPSULE | Refills: 0 | Status: SHIPPED | OUTPATIENT
Start: 2022-10-07 | End: 2022-12-26

## 2023-01-22 DIAGNOSIS — F41.0 PANIC ATTACK: ICD-10-CM

## 2023-01-22 DIAGNOSIS — F43.23 SITUATIONAL MIXED ANXIETY AND DEPRESSIVE DISORDER: ICD-10-CM

## 2023-01-23 RX ORDER — VENLAFAXINE HYDROCHLORIDE 37.5 MG/1
37.5 CAPSULE, EXTENDED RELEASE ORAL DAILY
Qty: 30 CAPSULE | Refills: 0 | Status: SHIPPED | OUTPATIENT
Start: 2023-01-23 | End: 2023-03-01

## 2023-03-01 DIAGNOSIS — F41.0 PANIC ATTACK: ICD-10-CM

## 2023-03-01 DIAGNOSIS — F43.23 SITUATIONAL MIXED ANXIETY AND DEPRESSIVE DISORDER: ICD-10-CM

## 2023-03-01 RX ORDER — VENLAFAXINE HYDROCHLORIDE 37.5 MG/1
37.5 CAPSULE, EXTENDED RELEASE ORAL DAILY
Qty: 30 CAPSULE | Refills: 0 | Status: SHIPPED | OUTPATIENT
Start: 2023-03-01 | End: 2023-07-05

## 2023-06-01 ENCOUNTER — TELEPHONE (OUTPATIENT)
Dept: FAMILY MEDICINE | Facility: CLINIC | Age: 23
End: 2023-06-01
Payer: COMMERCIAL

## 2023-06-01 NOTE — TELEPHONE ENCOUNTER
Patient Quality Outreach    Patient is due for the following:   Cervical Cancer Screening - PAP Needed  Physical Preventive Adult Physical      Topic Date Due     COVID-19 Vaccine (3 - Pfizer series) 07/22/2021     Diptheria Tetanus Pertussis (DTAP/TDAP/TD) Vaccine (7 - Td or Tdap) 06/11/2023           Type of outreach:    Phone, left message for patient/parent to call back. and Sent [employer] message.      Questions for provider review:    None           Esther Chávez CMA  Chart routed to Care Team.

## 2023-06-01 NOTE — LETTER
June 8, 2023      Sheridan Bustillos  [address]  [place] [address]    Your team at [facility] cares about your health. We have reviewed your chart and based on our findings; we are making the following recommendations to better manage your health.     You are in particular need of attention regarding the following:     Schedule a primary care office visit with your provider for a Pap Smear to screen for Cervical Cancer.  PREVENTATIVE VISIT: Physical  OTHER FOLLOW UP: Covid, Tetanus vaccine    If you have already completed these items, please contact the clinic via phone or   365nethart so your care team can review and update your records. Thank you for   choosing [facility] for your healthcare needs. For any questions,   concerns, or to schedule an appointment please contact our clinic.    Healthy Regards,      Your [facility] Care Team

## 2023-06-08 NOTE — TELEPHONE ENCOUNTER
Patient Quality Outreach        Type of outreach:    Sent letter.    Next Steps:  Reach out within 90 days via Phone and MyChart.    Max number of attempts reached: Yes. Will try again in 90 days if patient still on fail list.    Esther Chávez [facility]  Chart routed to Care Team.

## 2023-07-04 DIAGNOSIS — F43.23 SITUATIONAL MIXED ANXIETY AND DEPRESSIVE DISORDER: ICD-10-CM

## 2023-07-04 DIAGNOSIS — F41.0 PANIC ATTACK: ICD-10-CM

## 2023-07-05 RX ORDER — VENLAFAXINE HYDROCHLORIDE 37.5 MG/1
37.5 CAPSULE, EXTENDED RELEASE ORAL DAILY
Qty: 30 CAPSULE | Refills: 0 | Status: SHIPPED | OUTPATIENT
Start: 2023-07-05 | End: 2023-07-27

## 2023-07-27 ENCOUNTER — OFFICE VISIT (OUTPATIENT)
Dept: FAMILY MEDICINE | Facility: CLINIC | Age: 23
End: 2023-07-27
Payer: COMMERCIAL

## 2023-07-27 VITALS
SYSTOLIC BLOOD PRESSURE: 110 MMHG | RESPIRATION RATE: 16 BRPM | DIASTOLIC BLOOD PRESSURE: 70 MMHG | HEART RATE: 74 BPM | HEIGHT: 70 IN | BODY MASS INDEX: 19.76 KG/M2 | OXYGEN SATURATION: 100 % | WEIGHT: 138 LBS

## 2023-07-27 DIAGNOSIS — Z12.4 CERVICAL CANCER SCREENING: ICD-10-CM

## 2023-07-27 DIAGNOSIS — F41.0 PANIC ATTACK: ICD-10-CM

## 2023-07-27 DIAGNOSIS — Z11.3 SCREENING FOR STDS (SEXUALLY TRANSMITTED DISEASES): ICD-10-CM

## 2023-07-27 DIAGNOSIS — F43.23 SITUATIONAL MIXED ANXIETY AND DEPRESSIVE DISORDER: ICD-10-CM

## 2023-07-27 DIAGNOSIS — Z00.00 ROUTINE GENERAL MEDICAL EXAMINATION AT A HEALTH CARE FACILITY: Primary | ICD-10-CM

## 2023-07-27 DIAGNOSIS — L70.0 ACNE VULGARIS: ICD-10-CM

## 2023-07-27 PROCEDURE — 99213 OFFICE O/P EST LOW 20 MIN: CPT | Mod: 25 | Performed by: PHYSICIAN ASSISTANT

## 2023-07-27 PROCEDURE — 99395 PREV VISIT EST AGE 18-39: CPT | Performed by: PHYSICIAN ASSISTANT

## 2023-07-27 PROCEDURE — 87491 CHLMYD TRACH DNA AMP PROBE: CPT | Performed by: PHYSICIAN ASSISTANT

## 2023-07-27 PROCEDURE — 87591 N.GONORRHOEAE DNA AMP PROB: CPT | Performed by: PHYSICIAN ASSISTANT

## 2023-07-27 PROCEDURE — G0145 SCR C/V CYTO,THINLAYER,RESCR: HCPCS | Performed by: PHYSICIAN ASSISTANT

## 2023-07-27 RX ORDER — SULFACETAMIDE SODIUM 100 MG/ML
LOTION TOPICAL DAILY
Qty: 118 ML | Refills: 3 | Status: SHIPPED | OUTPATIENT
Start: 2023-07-27

## 2023-07-27 RX ORDER — HYDROXYZINE HYDROCHLORIDE 25 MG/1
25 TABLET, FILM COATED ORAL 3 TIMES DAILY PRN
Qty: 30 TABLET | Refills: 3 | Status: SHIPPED | OUTPATIENT
Start: 2023-07-27

## 2023-07-27 RX ORDER — TRETINOIN 0.5 MG/G
CREAM TOPICAL
Qty: 45 G | Refills: 3 | Status: SHIPPED | OUTPATIENT
Start: 2023-07-27 | End: 2024-05-03

## 2023-07-27 RX ORDER — VENLAFAXINE HYDROCHLORIDE 37.5 MG/1
37.5 CAPSULE, EXTENDED RELEASE ORAL DAILY
Qty: 90 CAPSULE | Refills: 3 | Status: SHIPPED | OUTPATIENT
Start: 2023-07-27 | End: 2024-06-17

## 2023-07-27 ASSESSMENT — ENCOUNTER SYMPTOMS
CHILLS: 0
DYSURIA: 0
BREAST MASS: 0
COUGH: 0
EYE PAIN: 0
CONSTIPATION: 0
JOINT SWELLING: 0
PALPITATIONS: 0
NERVOUS/ANXIOUS: 0
DIZZINESS: 0
FEVER: 0
HEARTBURN: 0
HEMATURIA: 0
ABDOMINAL PAIN: 0
SORE THROAT: 0
DIARRHEA: 0
ARTHRALGIAS: 0
PARESTHESIAS: 0
HEMATOCHEZIA: 0
NAUSEA: 0
WEAKNESS: 0
HEADACHES: 0
SHORTNESS OF BREATH: 0
FREQUENCY: 0
MYALGIAS: 0

## 2023-07-27 ASSESSMENT — ANXIETY QUESTIONNAIRES
2. NOT BEING ABLE TO STOP OR CONTROL WORRYING: SEVERAL DAYS
5. BEING SO RESTLESS THAT IT IS HARD TO SIT STILL: NOT AT ALL
GAD7 TOTAL SCORE: 4
7. FEELING AFRAID AS IF SOMETHING AWFUL MIGHT HAPPEN: SEVERAL DAYS
3. WORRYING TOO MUCH ABOUT DIFFERENT THINGS: SEVERAL DAYS
IF YOU CHECKED OFF ANY PROBLEMS ON THIS QUESTIONNAIRE, HOW DIFFICULT HAVE THESE PROBLEMS MADE IT FOR YOU TO DO YOUR WORK, TAKE CARE OF THINGS AT HOME, OR GET ALONG WITH OTHER PEOPLE: NOT DIFFICULT AT ALL
1. FEELING NERVOUS, ANXIOUS, OR ON EDGE: SEVERAL DAYS
6. BECOMING EASILY ANNOYED OR IRRITABLE: NOT AT ALL
GAD7 TOTAL SCORE: 4

## 2023-07-27 ASSESSMENT — PATIENT HEALTH QUESTIONNAIRE - PHQ9
5. POOR APPETITE OR OVEREATING: NOT AT ALL
SUM OF ALL RESPONSES TO PHQ QUESTIONS 1-9: 5

## 2023-07-27 NOTE — PROGRESS NOTES
SUBJECTIVE:   CC: Sheridan is an 23 year old who presents for preventive health visit.       Healthy Habits:     Getting at least 3 servings of Calcium per day:  Yes    Bi-annual eye exam:  Yes    Dental care twice a year:  Yes    Sleep apnea or symptoms of sleep apnea:  None    Diet:  Regular (no restrictions)    Frequency of exercise:  2-3 days/week    Duration of exercise:  30-45 minutes    Taking medications regularly:  Yes    Medication side effects:  None    Additional concerns today:  No    Patient arrived for Annual Physical. Due for PAP, undressing for exam. Patient is not fasting for labs. PHQ9 and GAD7 given to pt to complete in room.           Anxiety Follow-Up  How are you doing with your anxiety since your last visit? Improved   Are you having other symptoms that might be associated with anxiety? No  Have you had a significant life event? No   Are you feeling depressed? No  Do you have any concerns with your use of alcohol or other drugs? No  Uses hydroxyzine PRN.  25 mg was making her drowsy at first.  She had some 10 mg pills, but she would like a refill of the 25 mg and she can cut those in half if needed.     She will be traveling abroad to [place] for the next year and will need a refill on medications.       Social History     Tobacco Use    Smoking status: Former     Types: Cigarettes    Smokeless tobacco: Never    Tobacco comments:     social   Vaping Use    Vaping Use: Former   Substance Use Topics    Alcohol use: Yes     Comment: occasional    Drug use: Not Currently         2/14/2022     7:17 AM 3/14/2022     7:55 AM 7/27/2023    11:43 AM   TONY-7 SCORE   Total Score 9 2 4         2/14/2022     7:17 AM 3/14/2022     7:55 AM 7/27/2023    11:43 AM   PHQ   PHQ-9 Total Score 12 5 5   Q9: Thoughts of better off dead/self-harm past 2 weeks Several days Several days Not at all         7/27/2023    11:43 AM   Last PHQ-9   1.  Little interest or pleasure in doing things 0   2.  Feeling down,  depressed, or hopeless 0   3.  Trouble falling or staying asleep, or sleeping too much 0   4.  Feeling tired or having little energy 1   5.  Poor appetite or overeating 3   6.  Feeling bad about yourself 0   7.  Trouble concentrating 0   8.  Moving slowly or restless 1   Q9: Thoughts of better off dead/self-harm past 2 weeks 0   PHQ-9 Total Score 5         7/27/2023    11:43 AM   TONY-7    1. Feeling nervous, anxious, or on edge 1   2. Not being able to stop or control worrying 1   3. Worrying too much about different things 1   4. Trouble relaxing 0   5. Being so restless that it is hard to sit still 0   6. Becoming easily annoyed or irritable 0   7. Feeling afraid, as if something awful might happen 1   TONY-7 Total Score 4   If you checked any problems, how difficult have they made it for you to do your work, take care of things at home, or get along with other people? Not difficult at all     Have you ever done Advance Care Planning? (For example, a Health Directive, POLST, or a discussion with a medical provider or your loved ones about your wishes):     Social History     Tobacco Use    Smoking status: Former     Types: Cigarettes    Smokeless tobacco: Never    Tobacco comments:     social   Substance Use Topics    Alcohol use: Yes     Comment: occasional             7/27/2023    10:48 AM   Alcohol Use   Prescreen: >3 drinks/day or >7 drinks/week? No     Reviewed orders with patient.  Reviewed health maintenance and updated orders accordingly - Yes  Labs reviewed in EPIC  BP Readings from Last 3 Encounters:   07/27/23 110/70   07/28/22 116/84   07/19/21 116/69    Wt Readings from Last 3 Encounters:   07/27/23 62.6 kg (138 lb)   07/28/22 69.9 kg (154 lb)   07/19/21 73.5 kg (162 lb)                    Breast Cancer Screening:        History of abnormal Pap smear: NO - age 21-29 PAP every 3 years recommended     Reviewed and updated as needed this visit by clinical staff    Allergies  Meds   Med Hx   Fam Hx    "       Reviewed and updated as needed this visit by Provider       Med Hx   Fam Hx             Review of Systems   Constitutional:  Negative for chills and fever.   HENT:  Negative for congestion, ear pain, hearing loss and sore throat.    Eyes:  Negative for pain and visual disturbance.   Respiratory:  Negative for cough and shortness of breath.    Cardiovascular:  Negative for chest pain, palpitations and peripheral edema.   Gastrointestinal:  Negative for abdominal pain, constipation, diarrhea, heartburn, hematochezia and nausea.   Breasts:  Negative for tenderness, breast mass and discharge.   Genitourinary:  Negative for dysuria, frequency, genital sores, hematuria, pelvic pain, urgency, vaginal bleeding and vaginal discharge.   Musculoskeletal:  Negative for arthralgias, joint swelling and myalgias.   Skin:  Negative for rash.   Neurological:  Negative for dizziness, weakness, headaches and paresthesias.   Psychiatric/Behavioral:  Negative for mood changes. The patient is not nervous/anxious.      CONSTITUTIONAL: NEGATIVE for fever, chills, change in weight  INTEGUMENTARU/SKIN: NEGATIVE for worrisome rashes, moles or lesions  EYES: NEGATIVE for vision changes or irritation  ENT: NEGATIVE for ear, mouth and throat problems  RESP: NEGATIVE for significant cough or SOB  BREAST: NEGATIVE for masses, tenderness or discharge  CV: NEGATIVE for chest pain, palpitations or peripheral edema  GI: NEGATIVE for nausea, abdominal pain, heartburn, or change in bowel habits  : NEGATIVE for unusual urinary or vaginal symptoms. Periods are regular.  MUSCULOSKELETAL: NEGATIVE for significant arthralgias or myalgia  NEURO: NEGATIVE for weakness, dizziness or paresthesias  PSYCHIATRIC: NEGATIVE for changes in mood or affect     OBJECTIVE:   /70 (BP Location: Left arm, Patient Position: Chair, Cuff Size: Adult Regular)   Pulse 74   Resp 16   Ht 1.77 m (5' 9.69\")   Wt 62.6 kg (138 lb)   LMP 07/23/2023 (Exact Date)   " SpO2 100%   BMI 19.98 kg/m    Physical Exam  GENERAL: healthy, alert and no distress  EYES: Eyes grossly normal to inspection, PERRL and conjunctivae and sclerae normal  HENT: ear canals and TM's normal, nose and mouth without ulcers or lesions  NECK: no adenopathy, no asymmetry, masses, or scars and thyroid normal to palpation  RESP: lungs clear to auscultation - no rales, rhonchi or wheezes  CV: regular rate and rhythm, normal S1 S2, no S3 or S4, no murmur, click or rub, no peripheral edema and peripheral pulses strong  ABDOMEN: soft, nontender, no hepatosplenomegaly, no masses and bowel sounds normal   (female): normal female external genitalia, normal urethral meatus, vaginal mucosa pink, moist, well rugated, and normal cervix/adnexa/uterus without masses or discharge  MS: no gross musculoskeletal defects noted, no edema  SKIN: no suspicious lesions or rashes  NEURO: Normal strength and tone, mentation intact and speech normal  PSYCH: mentation appears normal, affect normal/bright    Diagnostic Test Results:  Labs reviewed in Epic    ASSESSMENT/PLAN:   (Z00.00) Routine general medical examination at a health care facility  (primary encounter diagnosis)  Comment:     HEALTH CARE MAINTENANCE              Reviewed USPTF recommendations and anticipatory guidance.              See orders.   I discussed in detail with Sheridan Bustillos the importance of cervical cancer screenings through pap smears, will repeat pap every 3 year(s).      She is no longer taking OCP, she may want to do an IUD in the future.  She should schedule a consult if this is something she would like placed.     (Z11.3) Screening for STDs (sexually transmitted diseases)  Comment: I discussed the transmission and risks associated with STD's as well as appropriate prevention, screening and treatment.    Plan: NEISSERIA GONORRHOEA PCR, CHLAMYDIA TRACHOMATIS        PCR            (Z12.4) Cervical cancer screening  Comment: This was her first pap smear.   I took extra time today showing her what this test involves (using patient handout diagram) and why it is important to be screened on a regular basis.      Plan: Pap Screen only - recommended age 21 - 24 years            (F41.0) Panic attack  Comment: will leave meds as is.   Plan: venlafaxine (EFFEXOR XR) 37.5 MG 24 hr capsule,        hydrOXYzine (ATARAX) 25 MG tablet            (F43.23) Situational mixed anxiety and depressive disorder  Comment: will leave meds as is  Plan: venlafaxine (EFFEXOR XR) 37.5 MG 24 hr capsule,        hydrOXYzine (ATARAX) 25 MG tablet            (L70.0) Acne vulgaris  Comment: stable.   Plan: tretinoin (RETIN-A) 0.05 % external cream,         sulfacetamide sodium, Acne, 10 % lotion            Patient has been advised of split billing requirements and indicates understanding: Yes      COUNSELING:  Reviewed preventive health counseling, as reflected in patient instructions       Regular exercise       Healthy diet/nutrition       Contraception        She reports that she has quit smoking. Her smoking use included cigarettes. She has never used smokeless tobacco.          Yeny Askew PA-C  [facility]

## 2023-07-27 NOTE — PATIENT INSTRUCTIONS
I do recommend you get your 2nd and final dose of Hepatitis A vaccine.   You are also due for a tetanus shot.   You can get these at the pharmacy.       Preventive Health Recommendations  Female Ages 21 to 25     Yearly exam:   See your health care provider every year in order to  Review health changes.   Discuss preventive care.    Review your medicines if your doctor has prescribed any.    You should be tested each year for STDs (sexually transmitted diseases).     Talk to your provider about how often you should have cholesterol testing.    Get a Pap test every three years. If you have an abnormal result, your doctor may have you test more often.    If you are at risk for diabetes, you should have a diabetes test (fasting glucose).     Shots:   Get a flu shot each year.   Get a tetanus shot every 10 years.   Consider getting the shot (vaccine) that prevents cervical cancer (Gardasil).    Nutrition:   Eat at least 5 servings of fruits and vegetables each day.  Eat whole-grain bread, whole-wheat pasta and brown rice instead of white grains and rice.  Get adequate Calcium and Vitamin D.     Lifestyle  Exercise at least 150 minutes a week each week (30 minutes a day, 5 days a week). This will help you control your weight and prevent disease.  Limit alcohol to one drink per day.  No smoking.   Wear sunscreen to prevent skin cancer.  See your dentist every six months for an exam and cleaning.

## 2023-07-28 LAB
C TRACH DNA SPEC QL NAA+PROBE: NEGATIVE
N GONORRHOEA DNA SPEC QL NAA+PROBE: NEGATIVE

## 2023-08-01 LAB
BKR LAB AP GYN ADEQUACY: NORMAL
BKR LAB AP GYN INTERPRETATION: NORMAL
BKR LAB AP HPV REFLEX: NO
BKR LAB AP PREVIOUS ABNORMAL: NORMAL
PATH REPORT.COMMENTS IMP SPEC: NORMAL
PATH REPORT.COMMENTS IMP SPEC: NORMAL
PATH REPORT.RELEVANT HX SPEC: NORMAL

## 2024-05-03 DIAGNOSIS — L70.0 ACNE VULGARIS: ICD-10-CM

## 2024-05-03 RX ORDER — TRETINOIN 0.5 MG/G
CREAM TOPICAL
Qty: 45 G | Refills: 0 | Status: SHIPPED | OUTPATIENT
Start: 2024-05-03

## 2024-06-04 ENCOUNTER — TELEPHONE (OUTPATIENT)
Dept: FAMILY MEDICINE | Facility: CLINIC | Age: 24
End: 2024-06-04

## 2024-06-04 NOTE — TELEPHONE ENCOUNTER
Reason for Call:  Appointment Request    Patient requesting this type of appt:  Preventive     Requested provider: Yeny Askew or katarina hennessy    Reason patient unable to be scheduled: Not within requested timeframe    When does patient want to be seen/preferred time: 6/18-6/26 - pt is home from [facility] during these days, is in the [place] other days for school    Comments: both providers were out until August. Mom also wants proxy set up    Could we send this information to you in [employer] or would you prefer to receive a phone call?:   angel phone number: [phone]. Pts phone doesn't work in the [place].  Unsure if pt has [employer] access?    Call taken on 6/4/2024 at 11:13 AM by Emma Ho

## 2024-06-05 NOTE — TELEPHONE ENCOUNTER
I'm also out of the office 5 clinic days during that time period. The other 3 days I don't have any openings for physicals. Could do a med check for refills virtually?

## 2024-06-05 NOTE — TELEPHONE ENCOUNTER
I no longer have that slot available.  Yes, please schedule a virtual visit for med refills    Yeny Askew PA-C

## 2024-06-05 NOTE — TELEPHONE ENCOUNTER
"RN reviewed pcp slots. There is currently one opening on 6/26/24 \"triage\" block. Before calling the patient, checking to see if pcp would approve this appt for a annual wellness?     If not, please update and we can try to schedule virtual visit for med check only.     Neeru Calvin RN on 6/5/2024 at 9:46 AM    "

## 2024-06-05 NOTE — TELEPHONE ENCOUNTER
Parent is returning the call, Yeny Arrietaumesh is out of the office(vacation) during the time patient will be home. Will Day Watson fit patient in during the dates 6/18-6/26?      Paulina Henry   [facility]  Central Scheduler

## 2024-06-17 DIAGNOSIS — F41.0 PANIC ATTACK: ICD-10-CM

## 2024-06-17 DIAGNOSIS — F43.23 SITUATIONAL MIXED ANXIETY AND DEPRESSIVE DISORDER: ICD-10-CM

## 2024-06-17 RX ORDER — VENLAFAXINE HYDROCHLORIDE 37.5 MG/1
37.5 CAPSULE, EXTENDED RELEASE ORAL DAILY
Qty: 30 CAPSULE | Refills: 0 | Status: SHIPPED | OUTPATIENT
Start: 2024-06-17

## 2024-06-27 ENCOUNTER — PATIENT OUTREACH (OUTPATIENT)
Dept: CARE COORDINATION | Facility: CLINIC | Age: 24
End: 2024-06-27
Payer: COMMERCIAL

## 2024-07-11 ENCOUNTER — PATIENT OUTREACH (OUTPATIENT)
Dept: CARE COORDINATION | Facility: CLINIC | Age: 24
End: 2024-07-11
Payer: COMMERCIAL

## 2024-09-15 ENCOUNTER — HEALTH MAINTENANCE LETTER (OUTPATIENT)
Age: 24
End: 2024-09-15

## 2024-10-17 DIAGNOSIS — F43.23 SITUATIONAL MIXED ANXIETY AND DEPRESSIVE DISORDER: ICD-10-CM

## 2024-10-17 DIAGNOSIS — F41.0 PANIC ATTACK: ICD-10-CM

## 2024-10-18 RX ORDER — HYDROXYZINE HYDROCHLORIDE 25 MG/1
25 TABLET, FILM COATED ORAL 3 TIMES DAILY PRN
Qty: 30 TABLET | Refills: 0 | Status: SHIPPED | OUTPATIENT
Start: 2024-10-18